# Patient Record
Sex: FEMALE | Race: WHITE | Employment: UNEMPLOYED | URBAN - METROPOLITAN AREA
[De-identification: names, ages, dates, MRNs, and addresses within clinical notes are randomized per-mention and may not be internally consistent; named-entity substitution may affect disease eponyms.]

---
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
## 2024-04-29 ENCOUNTER — HOSPITAL ENCOUNTER (EMERGENCY)
Age: 19
Discharge: HOME | End: 2024-04-29
Attending: EMERGENCY MEDICINE

## 2024-04-29 VITALS
HEIGHT: 64 IN | WEIGHT: 170 LBS | HEART RATE: 84 BPM | SYSTOLIC BLOOD PRESSURE: 133 MMHG | BODY MASS INDEX: 29.02 KG/M2 | DIASTOLIC BLOOD PRESSURE: 80 MMHG | TEMPERATURE: 99 F | OXYGEN SATURATION: 98 % | RESPIRATION RATE: 16 BRPM

## 2024-04-29 DIAGNOSIS — J02.9 ACUTE PHARYNGITIS, UNSPECIFIED ETIOLOGY: Primary | ICD-10-CM

## 2024-04-29 PROCEDURE — 99283 EMERGENCY DEPT VISIT LOW MDM: CPT

## 2024-04-29 PROCEDURE — 87430 STREP A AG IA: CPT

## 2024-04-29 PROCEDURE — 87081 CULTURE SCREEN ONLY: CPT

## 2024-04-29 PROCEDURE — 6370000000 HC RX 637 (ALT 250 FOR IP): Performed by: EMERGENCY MEDICINE

## 2024-04-29 RX ORDER — IBUPROFEN 400 MG/1
800 TABLET ORAL ONCE
Status: COMPLETED | OUTPATIENT
Start: 2024-04-29 | End: 2024-04-29

## 2024-04-29 RX ORDER — PENICILLIN G BENZATHINE AND PENICILLIN G PROCAINE 600000; 600000 [IU]/2ML; [IU]/2ML
1.2 INJECTION, SUSPENSION INTRAMUSCULAR ONCE
Qty: 2 ML | Refills: 0 | Status: SHIPPED | OUTPATIENT
Start: 2024-04-29 | End: 2024-04-29 | Stop reason: ALTCHOICE

## 2024-04-29 RX ORDER — AMOXICILLIN 500 MG/1
500 CAPSULE ORAL 2 TIMES DAILY
Qty: 20 CAPSULE | Refills: 0 | Status: SHIPPED | OUTPATIENT
Start: 2024-04-29 | End: 2024-05-09

## 2024-04-29 RX ADMIN — IBUPROFEN 800 MG: 400 TABLET, FILM COATED ORAL at 11:04

## 2024-04-29 ASSESSMENT — PAIN SCALES - GENERAL
PAINLEVEL_OUTOF10: 10
PAINLEVEL_OUTOF10: 10

## 2024-04-29 ASSESSMENT — PAIN - FUNCTIONAL ASSESSMENT: PAIN_FUNCTIONAL_ASSESSMENT: 0-10

## 2024-04-29 ASSESSMENT — PAIN DESCRIPTION - LOCATION: LOCATION: THROAT

## 2024-04-29 NOTE — ED PROVIDER NOTES
Emergency Department Encounter    Patient: Sara Calvo  MRN: [id number]  : 2005  Date of Evaluation: 2024  ED Provider:  Wing Shay MD    MDM:    Clinical Impression:  1. Acute pharyngitis, unspecified etiology          Triage Chief Complaint: Pharyngitis        I completed a structured, evidence-based clinical evaluation to screen for acute emergent condition that poses a threat to life or bodily function.      Diagnostic studies/Differential diagnosis included: (with independent interpretations \"as interpreted by me\" and tests considered but not performed) patient presents with acute pharyngitis as below.  No evidence of retropharyngeal or peritonsillar abscess.  Strep screen negative with no evidence of strep pharyngitis at this time.  In shared decision-making with the patient, decision was made to not pursue additional viral testing.  No airway compromise.  Patient will be treated supportively.    Medications ordered in the ED:  ED Medication Orders (From admission, onward)      Start Ordered     Status Ordering Provider    24 1045 24 1040  ibuprofen (ADVIL;MOTRIN) tablet 800 mg  ONCE         Last MAR action: Given - by GLYNN PEREZ on 24 at 1104 WING SHAY considered the following social determinants of health in the patient's treatment plan:   Social Determinants of Health     Tobacco Use: High Risk (2024)    Patient History     Smoking Tobacco Use: Some Days     Smokeless Tobacco Use: Unknown     Passive Exposure: Not on file   Alcohol Use: Not on file   Financial Resource Strain: Not on file   Food Insecurity: Not on file   Transportation Needs: Not on file   Physical Activity: Not on file   Stress: Not on file   Social Connections: Not on file   Intimate Partner Violence: Not on file   Depression: Not on file   Housing Stability: Not on file   Interpersonal Safety: Not on file   Utilities: Not on file        Patient

## 2024-04-29 NOTE — DISCHARGE INSTRUCTIONS
Return immediately to the emergency department if you experience new or worsened symptoms, difficulty breathing/speaking/swallowing, abdominal pain, or for any other concerns.

## 2024-04-29 NOTE — DISCHARGE INSTR - COC
Continuity of Care Form    Patient Name: Sara Calvo   :  2005  MRN:  [id number]    Admit date:  2024  Discharge date:  ***    Code Status Order: No Order   Advance Directives:     Admitting Physician:  No admitting provider for patient encounter.  PCP: No primary care provider on file.    Discharging Nurse: ***  Discharging Hospital Unit/Room#: ED-07/E07  Discharging Unit Phone Number: ***    Emergency Contact:   Extended Emergency Contact Information  Primary Emergency Contact: SHON GAN  Home Phone: [phone]  Relation: Parent    Past Surgical History:  History reviewed. No pertinent surgical history.    Immunization History:     There is no immunization history on file for this patient.    Active Problems:  There is no problem list on file for this patient.      Isolation/Infection:   Isolation            No Isolation          Patient Infection Status       None to display                     Nurse Assessment:  Last Vital Signs: /80   Temp 99 °F (37.2 °C) (Oral)   Resp 16   Ht 1.626 m (5' 4\")   Wt 77.1 kg (170 lb)   SpO2 98%   BMI 29.18 kg/m²     Last documented pain score (0-10 scale): Pain Level: 10  Last Weight:   Wt Readings from Last 1 Encounters:   24 77.1 kg (170 lb) (92 %, Z= 1.38)*     * Growth percentiles are based on CDC (Girls, 2-20 Years) data.     Mental Status:  {IP PT MENTAL STATUS:01401}    IV Access:  { ANABELA IV ACCESS:667216804}    Nursing Mobility/ADLs:  Walking   {CHP DME ADLs:718561007}  Transfer  {CHP DME ADLs:691529145}  Bathing  {CHP DME ADLs:696735062}  Dressing  {CHP DME ADLs:965061488}  Toileting  {CHP DME ADLs:770408005}  Feeding  {CHP DME ADLs:030608574}  Med Admin  {CHP DME ADLs:018553617}  Med Delivery   { ANABELA MED Delivery:072905330}    Wound Care Documentation and Therapy:        Elimination:  Continence:   Bowel: {YES / NO:}  Bladder: {YES / NO:}  Urinary Catheter: {Urinary Catheter:637721010}

## 2024-04-29 NOTE — ED NOTES
Discharge and prescription information was given to the patient who expressed understanding of information and follow up care.

## 2024-05-01 LAB
CULTURE: NORMAL
Lab: NORMAL
SPECIMEN: NORMAL
STREP A DIRECT SCREEN: NEGATIVE